# Patient Record
Sex: FEMALE | Race: BLACK OR AFRICAN AMERICAN | Employment: PART TIME | URBAN - METROPOLITAN AREA
[De-identification: names, ages, dates, MRNs, and addresses within clinical notes are randomized per-mention and may not be internally consistent; named-entity substitution may affect disease eponyms.]

---

## 2023-05-08 ENCOUNTER — HOSPITAL ENCOUNTER (OUTPATIENT)
Dept: GENERAL RADIOLOGY | Age: 43
Discharge: HOME | End: 2023-05-08
Payer: COMMERCIAL

## 2023-05-08 ENCOUNTER — OFFICE VISIT (OUTPATIENT)
Dept: FAMILY MEDICINE CLINIC | Age: 43
End: 2023-05-08
Payer: COMMERCIAL

## 2023-05-08 ENCOUNTER — HOSPITAL ENCOUNTER (OUTPATIENT)
Age: 43
Discharge: HOME | End: 2023-05-08
Payer: COMMERCIAL

## 2023-05-08 VITALS
HEIGHT: 66 IN | WEIGHT: 258 LBS | OXYGEN SATURATION: 98 % | HEART RATE: 81 BPM | BODY MASS INDEX: 41.46 KG/M2 | DIASTOLIC BLOOD PRESSURE: 80 MMHG | SYSTOLIC BLOOD PRESSURE: 102 MMHG

## 2023-05-08 DIAGNOSIS — M25.561 RIGHT KNEE PAIN, UNSPECIFIED CHRONICITY: ICD-10-CM

## 2023-05-08 DIAGNOSIS — R10.9 ABDOMINAL PAIN, UNSPECIFIED ABDOMINAL LOCATION: Primary | ICD-10-CM

## 2023-05-08 DIAGNOSIS — G43.909 MIGRAINE WITHOUT STATUS MIGRAINOSUS, NOT INTRACTABLE, UNSPECIFIED MIGRAINE TYPE: ICD-10-CM

## 2023-05-08 PROCEDURE — 99204 OFFICE O/P NEW MOD 45 MIN: CPT | Performed by: FAMILY MEDICINE

## 2023-05-08 PROCEDURE — G8417 CALC BMI ABV UP PARAM F/U: HCPCS | Performed by: FAMILY MEDICINE

## 2023-05-08 PROCEDURE — G8427 DOCREV CUR MEDS BY ELIG CLIN: HCPCS | Performed by: FAMILY MEDICINE

## 2023-05-08 PROCEDURE — 96372 THER/PROPH/DIAG INJ SC/IM: CPT | Performed by: FAMILY MEDICINE

## 2023-05-08 PROCEDURE — 1036F TOBACCO NON-USER: CPT | Performed by: FAMILY MEDICINE

## 2023-05-08 PROCEDURE — 73562 X-RAY EXAM OF KNEE 3: CPT

## 2023-05-08 RX ORDER — DICYCLOMINE HYDROCHLORIDE 10 MG/5ML
10 SOLUTION ORAL
Qty: 120 ML | Refills: 3 | Status: SHIPPED | OUTPATIENT
Start: 2023-05-08

## 2023-05-08 RX ORDER — ACETAMINOPHEN 500 MG
500 TABLET ORAL EVERY 6 HOURS PRN
COMMUNITY
Start: 2023-04-24

## 2023-05-08 RX ORDER — KETOROLAC TROMETHAMINE 30 MG/ML
30 INJECTION, SOLUTION INTRAMUSCULAR; INTRAVENOUS ONCE
Status: COMPLETED | OUTPATIENT
Start: 2023-05-08 | End: 2023-05-08

## 2023-05-08 RX ORDER — ELETRIPTAN HYDROBROMIDE 40 MG/1
40 TABLET, FILM COATED ORAL
Qty: 9 TABLET | Refills: 3 | Status: SHIPPED | OUTPATIENT
Start: 2023-05-08 | End: 2023-05-08

## 2023-05-08 RX ORDER — NAPROXEN 500 MG/1
500 TABLET ORAL 2 TIMES DAILY PRN
COMMUNITY
Start: 2023-04-24

## 2023-05-08 RX ADMIN — KETOROLAC TROMETHAMINE 30 MG: 30 INJECTION, SOLUTION INTRAMUSCULAR; INTRAVENOUS at 14:52

## 2023-05-10 NOTE — RESULT ENCOUNTER NOTE
Knee x-ray result is actually pretty good with no abnormalities. No evidence of arthritis or anything like that so I would probably see if the pain gets better over the next few weeks. Not routed for staff call but released to [facility] only.

## 2023-05-21 PROBLEM — M25.561 RIGHT KNEE PAIN: Status: ACTIVE | Noted: 2023-05-21

## 2023-05-21 ASSESSMENT — ENCOUNTER SYMPTOMS
ABDOMINAL DISTENTION: 1
BACK PAIN: 0
WHEEZING: 0
CHEST TIGHTNESS: 0
ABDOMINAL PAIN: 1
COUGH: 0
SHORTNESS OF BREATH: 0

## 2023-06-22 SDOH — ECONOMIC STABILITY: FOOD INSECURITY: WITHIN THE PAST 12 MONTHS, THE FOOD YOU BOUGHT JUST DIDN'T LAST AND YOU DIDN'T HAVE MONEY TO GET MORE.: SOMETIMES TRUE

## 2023-06-22 SDOH — ECONOMIC STABILITY: INCOME INSECURITY: HOW HARD IS IT FOR YOU TO PAY FOR THE VERY BASICS LIKE FOOD, HOUSING, MEDICAL CARE, AND HEATING?: SOMEWHAT HARD

## 2023-06-22 SDOH — ECONOMIC STABILITY: FOOD INSECURITY: WITHIN THE PAST 12 MONTHS, YOU WORRIED THAT YOUR FOOD WOULD RUN OUT BEFORE YOU GOT MONEY TO BUY MORE.: SOMETIMES TRUE

## 2023-06-22 SDOH — ECONOMIC STABILITY: HOUSING INSECURITY
IN THE LAST 12 MONTHS, WAS THERE A TIME WHEN YOU DID NOT HAVE A STEADY PLACE TO SLEEP OR SLEPT IN A SHELTER (INCLUDING NOW)?: NO

## 2023-06-22 SDOH — ECONOMIC STABILITY: TRANSPORTATION INSECURITY
IN THE PAST 12 MONTHS, HAS LACK OF TRANSPORTATION KEPT YOU FROM MEETINGS, WORK, OR FROM GETTING THINGS NEEDED FOR DAILY LIVING?: NO

## 2023-06-23 ENCOUNTER — TELEMEDICINE (OUTPATIENT)
Dept: FAMILY MEDICINE CLINIC | Age: 43
End: 2023-06-23
Payer: COMMERCIAL

## 2023-06-23 DIAGNOSIS — Z79.899 MEDICAL MARIJUANA USE: ICD-10-CM

## 2023-06-23 DIAGNOSIS — G43.909 MIGRAINE WITHOUT STATUS MIGRAINOSUS, NOT INTRACTABLE, UNSPECIFIED MIGRAINE TYPE: Primary | ICD-10-CM

## 2023-06-23 DIAGNOSIS — M25.561 RIGHT KNEE PAIN, UNSPECIFIED CHRONICITY: ICD-10-CM

## 2023-06-23 DIAGNOSIS — K58.2 IRRITABLE BOWEL SYNDROME WITH BOTH CONSTIPATION AND DIARRHEA: ICD-10-CM

## 2023-06-23 PROCEDURE — G8428 CUR MEDS NOT DOCUMENT: HCPCS | Performed by: FAMILY MEDICINE

## 2023-06-23 PROCEDURE — 99214 OFFICE O/P EST MOD 30 MIN: CPT | Performed by: FAMILY MEDICINE

## 2023-06-23 RX ORDER — TOPIRAMATE 25 MG/1
25 TABLET ORAL 2 TIMES DAILY
Qty: 60 TABLET | Refills: 1 | Status: SHIPPED | OUTPATIENT
Start: 2023-06-23

## 2023-06-28 ASSESSMENT — ENCOUNTER SYMPTOMS
CHEST TIGHTNESS: 0
COUGH: 0
WHEEZING: 0
BACK PAIN: 0
ABDOMINAL PAIN: 1
SHORTNESS OF BREATH: 0

## 2023-07-05 ENCOUNTER — TELEPHONE (OUTPATIENT)
Dept: FAMILY MEDICINE CLINIC | Age: 43
End: 2023-07-05

## 2023-07-05 DIAGNOSIS — G43.909 MIGRAINE WITHOUT STATUS MIGRAINOSUS, NOT INTRACTABLE, UNSPECIFIED MIGRAINE TYPE: Primary | ICD-10-CM

## 2023-07-05 DIAGNOSIS — G43.909 MIGRAINE WITHOUT STATUS MIGRAINOSUS, NOT INTRACTABLE, UNSPECIFIED MIGRAINE TYPE: ICD-10-CM

## 2023-07-05 RX ORDER — TOPIRAMATE 50 MG/1
50 TABLET, FILM COATED ORAL 2 TIMES DAILY
Qty: 60 TABLET | Refills: 1 | COMMUNITY
Start: 2023-07-05

## 2023-07-05 NOTE — TELEPHONE ENCOUNTER
----- Message from Mariusz Oden MD sent at 7/5/2023  4:39 PM EDT -----  Regarding: Increased headaches  Please contact patient. Let her know I sent increased Topamax dosing to her pharmacy, boosting her to a 50 mg tablet of Topamax twice a day. Depending on how that works we may increase further. I see she went to the ER at [facility]. She can come in or have a virtual to review, or I can refer to [facility] in [facility] directly. Let me know what she needs.      [id number]

## 2023-07-05 NOTE — PROGRESS NOTES
Patient called yesterday through answering service stating that migraines had increased in frequency and severity despite starting her on Topamax 25. She had such significant symptoms that she felt like she would need to go to the ER. She has not had significant benefit from Relpax that is lasting. She had an MRI remotely and neurology consultation years ago. It sounds like she may need to see them for assistance again. She had Botox injections without benefit previously but has not been tried on injectable meds for headache prevention like Ajovy or Emgality. She was advised to increase her Topamax to double dose to at bedtime and 2 in the AM, we may even increase it to 3 in 3. New prescription will be sent today.

## 2023-07-06 ENCOUNTER — PATIENT MESSAGE (OUTPATIENT)
Dept: FAMILY MEDICINE CLINIC | Age: 43
End: 2023-07-06

## 2023-07-06 NOTE — TELEPHONE ENCOUNTER
Referral generated to Dr. Liset Hobbs at [facility] in [facility], [phone] and fax [phone]

## 2023-07-07 RX ORDER — BUTALBITAL, ACETAMINOPHEN AND CAFFEINE 300; 40; 50 MG/1; MG/1; MG/1
1 CAPSULE ORAL EVERY 6 HOURS PRN
Qty: 15 CAPSULE | Refills: 0 | Status: SHIPPED | OUTPATIENT
Start: 2023-07-07

## 2023-07-07 NOTE — TELEPHONE ENCOUNTER
15 tabs only, rebound risk is very high. This is also a barbiturate. Jetlore message sent that we cannot continue this long-term.

## 2023-07-07 NOTE — TELEPHONE ENCOUNTER
From: Reynold Reed  To: Dr. Dmitry Stone: 7/6/2023 11:35 PM EDT  Subject: Refill of ER Medication     Hello,    I was wondering if I could get a refill of the Fioricet that I was given in the emergency room. It seems to be working a lot better than the Excedren Migraine that I normally use. Is it possible?  Thank you for your help,     Juarez Caro

## 2023-07-20 DIAGNOSIS — G43.909 MIGRAINE WITHOUT STATUS MIGRAINOSUS, NOT INTRACTABLE, UNSPECIFIED MIGRAINE TYPE: ICD-10-CM

## 2023-07-21 RX ORDER — ELETRIPTAN HYDROBROMIDE 40 MG/1
40 TABLET, FILM COATED ORAL
Qty: 4 TABLET | Refills: 15 | Status: SHIPPED | OUTPATIENT
Start: 2023-07-21 | End: 2023-07-21

## 2023-07-25 DIAGNOSIS — G43.909 MIGRAINE WITHOUT STATUS MIGRAINOSUS, NOT INTRACTABLE, UNSPECIFIED MIGRAINE TYPE: ICD-10-CM

## 2023-07-25 RX ORDER — TOPIRAMATE 25 MG/1
TABLET ORAL
Qty: 60 TABLET | Refills: 1 | Status: SHIPPED | OUTPATIENT
Start: 2023-07-25 | End: 2023-07-27

## 2023-07-27 RX ORDER — TOPIRAMATE 25 MG/1
25 TABLET ORAL 2 TIMES DAILY
Qty: 60 TABLET | Refills: 2 | OUTPATIENT
Start: 2023-07-27

## 2023-07-27 NOTE — TELEPHONE ENCOUNTER
Today I declined requested dose of Topamax 25 twice daily as it was increased to 50 twice daily earlier this month Rx sent then with refills.

## 2023-08-05 ASSESSMENT — PATIENT HEALTH QUESTIONNAIRE - PHQ9
8. MOVING OR SPEAKING SO SLOWLY THAT OTHER PEOPLE COULD HAVE NOTICED. OR THE OPPOSITE - BEING SO FIDGETY OR RESTLESS THAT YOU HAVE BEEN MOVING AROUND A LOT MORE THAN USUAL: NOT AT ALL
2. FEELING DOWN, DEPRESSED OR HOPELESS: NOT AT ALL
10. IF YOU CHECKED OFF ANY PROBLEMS, HOW DIFFICULT HAVE THESE PROBLEMS MADE IT FOR YOU TO DO YOUR WORK, TAKE CARE OF THINGS AT HOME, OR GET ALONG WITH OTHER PEOPLE: 0
7. TROUBLE CONCENTRATING ON THINGS, SUCH AS READING THE NEWSPAPER OR WATCHING TELEVISION: 0
3. TROUBLE FALLING OR STAYING ASLEEP: NOT AT ALL
10. IF YOU CHECKED OFF ANY PROBLEMS, HOW DIFFICULT HAVE THESE PROBLEMS MADE IT FOR YOU TO DO YOUR WORK, TAKE CARE OF THINGS AT HOME, OR GET ALONG WITH OTHER PEOPLE: NOT DIFFICULT AT ALL
SUM OF ALL RESPONSES TO PHQ9 QUESTIONS 1 & 2: 0
9. THOUGHTS THAT YOU WOULD BE BETTER OFF DEAD, OR OF HURTING YOURSELF: NOT AT ALL
8. MOVING OR SPEAKING SO SLOWLY THAT OTHER PEOPLE COULD HAVE NOTICED. OR THE OPPOSITE, BEING SO FIGETY OR RESTLESS THAT YOU HAVE BEEN MOVING AROUND A LOT MORE THAN USUAL: 0
1. LITTLE INTEREST OR PLEASURE IN DOING THINGS: 0
6. FEELING BAD ABOUT YOURSELF - OR THAT YOU ARE A FAILURE OR HAVE LET YOURSELF OR YOUR FAMILY DOWN: SEVERAL DAYS
4. FEELING TIRED OR HAVING LITTLE ENERGY: 1
SUM OF ALL RESPONSES TO PHQ QUESTIONS 1-9: 2
SUM OF ALL RESPONSES TO PHQ QUESTIONS 1-9: 2
7. TROUBLE CONCENTRATING ON THINGS, SUCH AS READING THE NEWSPAPER OR WATCHING TELEVISION: NOT AT ALL
4. FEELING TIRED OR HAVING LITTLE ENERGY: SEVERAL DAYS
6. FEELING BAD ABOUT YOURSELF - OR THAT YOU ARE A FAILURE OR HAVE LET YOURSELF OR YOUR FAMILY DOWN: 1
SUM OF ALL RESPONSES TO PHQ QUESTIONS 1-9: 2
1. LITTLE INTEREST OR PLEASURE IN DOING THINGS: NOT AT ALL
SUM OF ALL RESPONSES TO PHQ QUESTIONS 1-9: 2
9. THOUGHTS THAT YOU WOULD BE BETTER OFF DEAD, OR OF HURTING YOURSELF: 0
5. POOR APPETITE OR OVEREATING: NOT AT ALL
2. FEELING DOWN, DEPRESSED OR HOPELESS: 0
5. POOR APPETITE OR OVEREATING: 0
SUM OF ALL RESPONSES TO PHQ QUESTIONS 1-9: 2
3. TROUBLE FALLING OR STAYING ASLEEP: 0

## 2023-08-08 ENCOUNTER — OFFICE VISIT (OUTPATIENT)
Dept: FAMILY MEDICINE CLINIC | Age: 43
End: 2023-08-08
Payer: COMMERCIAL

## 2023-08-08 VITALS
DIASTOLIC BLOOD PRESSURE: 74 MMHG | WEIGHT: 255 LBS | OXYGEN SATURATION: 96 % | SYSTOLIC BLOOD PRESSURE: 126 MMHG | BODY MASS INDEX: 41.16 KG/M2 | HEART RATE: 66 BPM

## 2023-08-08 DIAGNOSIS — G89.29 CHRONIC PELVIC PAIN IN FEMALE: ICD-10-CM

## 2023-08-08 DIAGNOSIS — R73.9 HYPERGLYCEMIA: ICD-10-CM

## 2023-08-08 DIAGNOSIS — Z90.710 S/P COMPLETE HYSTERECTOMY: ICD-10-CM

## 2023-08-08 DIAGNOSIS — Z00.01 ENCOUNTER FOR WELL ADULT EXAM WITH ABNORMAL FINDINGS: ICD-10-CM

## 2023-08-08 DIAGNOSIS — Z79.899 MEDICAL MARIJUANA USE: ICD-10-CM

## 2023-08-08 DIAGNOSIS — R10.2 CHRONIC PELVIC PAIN IN FEMALE: ICD-10-CM

## 2023-08-08 DIAGNOSIS — R10.9 ABDOMINAL PAIN, UNSPECIFIED ABDOMINAL LOCATION: Primary | ICD-10-CM

## 2023-08-08 LAB
ALBUMIN SERPL-MCNC: 4.7 G/DL (ref 3.4–5)
ALBUMIN/GLOB SERPL: 1.6 {RATIO} (ref 1.1–2.2)
ALP SERPL-CCNC: 99 U/L (ref 40–129)
ALT SERPL-CCNC: 10 U/L (ref 10–40)
ANION GAP SERPL CALCULATED.3IONS-SCNC: 9 MMOL/L (ref 3–16)
AST SERPL-CCNC: 14 U/L (ref 15–37)
BASOPHILS # BLD: 0 K/UL (ref 0–0.2)
BASOPHILS NFR BLD: 0.5 %
BILIRUB SERPL-MCNC: <0.2 MG/DL (ref 0–1)
BUN SERPL-MCNC: 16 MG/DL (ref 7–20)
CALCIUM SERPL-MCNC: 10 MG/DL (ref 8.3–10.6)
CHLORIDE SERPL-SCNC: 103 MMOL/L (ref 99–110)
CHOLEST SERPL-MCNC: 193 MG/DL (ref 0–199)
CO2 SERPL-SCNC: 27 MMOL/L (ref 21–32)
CREAT SERPL-MCNC: 0.9 MG/DL (ref 0.6–1.1)
DEPRECATED RDW RBC AUTO: 15.1 % (ref 12.4–15.4)
EOSINOPHIL # BLD: 0.2 K/UL (ref 0–0.6)
EOSINOPHIL NFR BLD: 3.5 %
GFR SERPLBLD CREATININE-BSD FMLA CKD-EPI: >60 ML/MIN/{1.73_M2}
GLUCOSE P FAST SERPL-MCNC: 91 MG/DL (ref 70–99)
HCT VFR BLD AUTO: 35.8 % (ref 36–48)
HDLC SERPL-MCNC: 48 MG/DL (ref 40–60)
HGB BLD-MCNC: 11.9 G/DL (ref 12–16)
LDL CHOLESTEROL CALCULATED: 117 MG/DL
LYMPHOCYTES # BLD: 2 K/UL (ref 1–5.1)
LYMPHOCYTES NFR BLD: 29.7 %
MCH RBC QN AUTO: 29.3 PG (ref 26–34)
MCHC RBC AUTO-ENTMCNC: 33.3 G/DL (ref 31–36)
MCV RBC AUTO: 88.2 FL (ref 80–100)
MONOCYTES # BLD: 0.4 K/UL (ref 0–1.3)
MONOCYTES NFR BLD: 5.7 %
NEUTROPHILS # BLD: 4.1 K/UL (ref 1.7–7.7)
NEUTROPHILS NFR BLD: 60.6 %
PLATELET # BLD AUTO: 415 K/UL (ref 135–450)
PMV BLD AUTO: 8.6 FL (ref 5–10.5)
POTASSIUM SERPL-SCNC: 4.7 MMOL/L (ref 3.5–5.1)
PROT SERPL-MCNC: 7.7 G/DL (ref 6.4–8.2)
RBC # BLD AUTO: 4.06 M/UL (ref 4–5.2)
SODIUM SERPL-SCNC: 139 MMOL/L (ref 136–145)
TRIGL SERPL-MCNC: 138 MG/DL (ref 0–150)
VLDLC SERPL CALC-MCNC: 28 MG/DL
WBC # BLD AUTO: 6.8 K/UL (ref 4–11)

## 2023-08-08 PROCEDURE — G8417 CALC BMI ABV UP PARAM F/U: HCPCS | Performed by: FAMILY MEDICINE

## 2023-08-08 PROCEDURE — 99214 OFFICE O/P EST MOD 30 MIN: CPT | Performed by: FAMILY MEDICINE

## 2023-08-08 PROCEDURE — G8427 DOCREV CUR MEDS BY ELIG CLIN: HCPCS | Performed by: FAMILY MEDICINE

## 2023-08-08 PROCEDURE — 36415 COLL VENOUS BLD VENIPUNCTURE: CPT | Performed by: FAMILY MEDICINE

## 2023-08-08 PROCEDURE — 1036F TOBACCO NON-USER: CPT | Performed by: FAMILY MEDICINE

## 2023-08-08 RX ORDER — METHOCARBAMOL 750 MG/1
750 TABLET, FILM COATED ORAL 3 TIMES DAILY PRN
Qty: 90 TABLET | Refills: 1 | Status: SHIPPED | OUTPATIENT
Start: 2023-08-08 | End: 2023-10-07

## 2023-08-08 RX ORDER — NABUMETONE 750 MG/1
750 TABLET, FILM COATED ORAL 2 TIMES DAILY PRN
Qty: 60 TABLET | Refills: 1 | Status: SHIPPED | OUTPATIENT
Start: 2023-08-08

## 2023-08-08 NOTE — PROGRESS NOTES
Chief Complaint   Patient presents with    Pelvic Pain     Pt c/o chronic pelvic pain, pt is now working a job where she is on her feet all day and feels the pain has worsened since     Migraine     Migraines have been doing worse as well, she is on Topamax and was given rescue Fioricet       [place] NARRATIVE:    Patient presenting for follow-up of abdominal pain as above. She has a history of chronic pelvic pain dating back more than 10 years. She left the area and moved to attend school in [place] and ultimately underwent total abdominal hysterectomy there and had stated pain was better although she was still seeing urology and had seen OB/GYN care provider in [place] as recently as 2022 for chronic pain. Their note was reviewed actually from 2/9/2022 as it is visible in care everywhere. Their last recommendation was to consider pain management. She was hired for a position at the [employer] and moved back to [place]. In the past she has been on many medications for chronic pelvic pain, and currently using OTC ibuprofen and medical marijuana about once a week. She reports bilateral lower pelvic pain with tightness, it was not significant and OTC medications were helpful prior to new job but now with standing job for eight hours the pain escalates and becomes very painful and begins shooting into the private area. Taking aleve 3 tabs OTC in the am, then repeats the dose within a few hours. Bentyl is helping the upper abd pain, which is worse with higher doses of nsaid. She does not have nausea vomiting or diarrhea. BMI is 41. She is here today as she needs a letter for work to limit her standing hours. She states her employer told her she is not eligible for FMLA so she just needs a letter for accommodations due to disability. Patient is established unless otherwise noted. Above chief complaint and [place] obtained by this physician provider.      Review of Systems   Constitutional:

## 2023-08-09 LAB
EST. AVERAGE GLUCOSE BLD GHB EST-MCNC: 114 MG/DL
HBA1C MFR BLD: 5.6 %

## 2023-08-10 ASSESSMENT — ENCOUNTER SYMPTOMS
BACK PAIN: 0
SHORTNESS OF BREATH: 0
ABDOMINAL PAIN: 1
WHEEZING: 0
CHEST TIGHTNESS: 0
COUGH: 0

## 2023-10-09 DIAGNOSIS — R10.9 ABDOMINAL PAIN, UNSPECIFIED ABDOMINAL LOCATION: ICD-10-CM

## 2023-10-09 DIAGNOSIS — G89.29 CHRONIC PELVIC PAIN IN FEMALE: ICD-10-CM

## 2023-10-09 DIAGNOSIS — R10.2 CHRONIC PELVIC PAIN IN FEMALE: ICD-10-CM

## 2023-10-09 RX ORDER — NABUMETONE 750 MG/1
750 TABLET, FILM COATED ORAL 2 TIMES DAILY PRN
Qty: 60 TABLET | Refills: 1 | Status: SHIPPED | OUTPATIENT
Start: 2023-10-09

## 2023-11-14 DIAGNOSIS — G89.29 CHRONIC PELVIC PAIN IN FEMALE: ICD-10-CM

## 2023-11-14 DIAGNOSIS — R10.9 ABDOMINAL PAIN, UNSPECIFIED ABDOMINAL LOCATION: ICD-10-CM

## 2023-11-14 DIAGNOSIS — R10.2 CHRONIC PELVIC PAIN IN FEMALE: ICD-10-CM

## 2023-11-14 RX ORDER — METHOCARBAMOL 750 MG/1
750 TABLET, FILM COATED ORAL 3 TIMES DAILY PRN
Qty: 90 TABLET | Refills: 1 | Status: SHIPPED | OUTPATIENT
Start: 2023-11-14 | End: 2024-01-13

## 2023-11-14 NOTE — TELEPHONE ENCOUNTER
08/08/2023 99 40 - 129 U/L Final     AST   Date Value Ref Range Status   08/08/2023 14 (L) 15 - 37 U/L Final     ALT   Date Value Ref Range Status   08/08/2023 10 10 - 40 U/L Final     Est, Glom Filt Rate   Date Value Ref Range Status   08/08/2023 >60 >60 Final     Comment:     Pediatric calculator link  Nicanor.at. org/professionals/kdoqi/gfr_calculatorped  Effective Oct 3, 2022  These results are not intended for use in patients  <25years of age. eGFR results are calculated without  a race factor using the 2021 CKD-EPI equation. Careful  clinical correlation is recommended, particularly when  comparing to results calculated using previous equations. The CKD-EPI equation is less accurate in patients with  extremes of muscle mass, extra-renal metabolism of  creatinine, excessive creatinine ingestion, or following  therapy that affects renal tubular secretion. GFR    Date Value Ref Range Status   01/26/2016 >60 >60 Final     Comment:     Chronic Kidney Disease: less than 60 ml/min/1.73 sq.m. Kidney Failure: less than 15 ml/min/1.73 sq.m. Results valid for patients 18 years and older.        Albumin/Globulin Ratio   Date Value Ref Range Status   08/08/2023 1.6 1.1 - 2.2 Final       Lipids:    HDL   Date Value Ref Range Status   08/08/2023 48 40 - 60 mg/dL Final     LDL Calculated   Date Value Ref Range Status   08/08/2023 117 (H) <100 mg/dL Final     VLDL Cholesterol Calculated   Date Value Ref Range Status   08/08/2023 28 Not Established mg/dL Final       A1C:    Hemoglobin A1C   Date Value Ref Range Status   08/08/2023 5.6 See comment % Final     Comment:     Comment:  Diagnosis of Diabetes: > or = 6.5%  Increased risk of diabetes (Prediabetes): 5.7-6.4%  Glycemic Control: Nonpregnant Adults: <7.0%                    Pregnant: <6.0%           TSH:    No results found for: \"TSH\", \"TSHREFLEX\", \"TSHFT4\", \"TSHELE\", \"SKF7EHR\", \"TSHHS\"    UA:  Color, UA   Date Value Ref Range

## 2023-11-20 DIAGNOSIS — G43.909 MIGRAINE WITHOUT STATUS MIGRAINOSUS, NOT INTRACTABLE, UNSPECIFIED MIGRAINE TYPE: ICD-10-CM

## 2023-11-20 RX ORDER — ELETRIPTAN HYDROBROMIDE 40 MG/1
40 TABLET, FILM COATED ORAL
Qty: 4 TABLET | Refills: 15 | Status: SHIPPED | OUTPATIENT
Start: 2023-11-20 | End: 2023-11-24

## 2023-11-24 ENCOUNTER — TELEMEDICINE (OUTPATIENT)
Dept: FAMILY MEDICINE CLINIC | Age: 43
End: 2023-11-24
Payer: COMMERCIAL

## 2023-11-24 DIAGNOSIS — G43.909 MIGRAINE WITHOUT STATUS MIGRAINOSUS, NOT INTRACTABLE, UNSPECIFIED MIGRAINE TYPE: ICD-10-CM

## 2023-11-24 DIAGNOSIS — R19.00 ABDOMINAL MASS, UNSPECIFIED ABDOMINAL LOCATION: ICD-10-CM

## 2023-11-24 DIAGNOSIS — R10.2 CHRONIC PELVIC PAIN IN FEMALE: ICD-10-CM

## 2023-11-24 DIAGNOSIS — D22.9 BENIGN MOLE: ICD-10-CM

## 2023-11-24 DIAGNOSIS — G89.29 CHRONIC PELVIC PAIN IN FEMALE: ICD-10-CM

## 2023-11-24 DIAGNOSIS — N63.10 MASS OF RIGHT BREAST, UNSPECIFIED QUADRANT: Primary | ICD-10-CM

## 2023-11-24 PROCEDURE — G8428 CUR MEDS NOT DOCUMENT: HCPCS | Performed by: FAMILY MEDICINE

## 2023-11-24 PROCEDURE — 99214 OFFICE O/P EST MOD 30 MIN: CPT | Performed by: FAMILY MEDICINE

## 2023-11-24 RX ORDER — GABAPENTIN 300 MG/1
600 CAPSULE ORAL 3 TIMES DAILY
COMMUNITY
Start: 2023-11-08

## 2023-11-24 ASSESSMENT — ENCOUNTER SYMPTOMS
CHEST TIGHTNESS: 0
COUGH: 0
SHORTNESS OF BREATH: 0
ABDOMINAL PAIN: 0
BACK PAIN: 0
WHEEZING: 0

## 2023-11-24 NOTE — PROGRESS NOTES
Virtual visit today to recheck on migraines and chronic pelvic pain. Last seen in August.  Labs obtained then afterwards were good except for minor low hemoglobin and minor low liver enzyme.
her.  Although pelvic pain had improved for a time it appears worse again and is accompanied by musculoskeletal issues. She may be a candidate to apply for disability as if she cannot do a partial sitting job then her work opportunities will be limited. She has exhausted multiple treatment modalities in the past and has declined additional options like dry needling of the pelvic musculature and any further physical therapy. She is on medical marijuana so is not a candidate for any pain medication that may be addictive. She is not sure where to go from here as it would take a long time to get certified for disability even if she could be. In the meantime now she is looking for an alternative job which is probably her best pursuit. Return in about 3 months (around 2/24/2024), or if symptoms worsen or fail to improve, for Recheck on multiple issues in 3 months, call sooner if problems. Carolina Waggoner is a 39 y.o. female being evaluated by a Virtual Visit (video visit) encounter to address concerns as mentioned above. A caregiver was present when appropriate. PATIENT GAVE EXPRESS VERBAL CONSENT TODAY to receiving care by a VIRTUAL VISIT. Due to this being a TeleHealth encounter evaluation of the following organ systems was limited: Vitals/Constitutional/EENT/Resp/CV/GI//MS/Neuro/Skin/Heme-Lymph-Imm. The patient (and/or legal guardian) has also been advised to contact this office for worsening conditions or problems, and seek emergency medical treatment and/or call 911 if deemed necessary. Services were provided through a video synchronous discussion virtually to substitute for in-person clinic visit. Total time spent for this encounter:  40 min with review of outside notes and charting along with in person Jolene Rogers MD on 11/24/2023 at 1:51 PM    An electronic signature was used to authenticate this note.

## 2023-11-27 ENCOUNTER — TELEPHONE (OUTPATIENT)
Dept: FAMILY MEDICINE CLINIC | Age: 43
End: 2023-11-27

## 2023-11-27 DIAGNOSIS — R10.2 CHRONIC PELVIC PAIN IN FEMALE: ICD-10-CM

## 2023-11-27 DIAGNOSIS — G89.29 CHRONIC PELVIC PAIN IN FEMALE: ICD-10-CM

## 2023-11-27 DIAGNOSIS — R10.9 ABDOMINAL PAIN, UNSPECIFIED ABDOMINAL LOCATION: ICD-10-CM

## 2023-11-27 RX ORDER — NABUMETONE 750 MG/1
750 TABLET, FILM COATED ORAL 2 TIMES DAILY PRN
Qty: 60 TABLET | Refills: 5 | Status: SHIPPED | OUTPATIENT
Start: 2023-11-27

## 2023-11-27 NOTE — TELEPHONE ENCOUNTER
----- Message from Mavis Shukla MD sent at 11/24/2023  1:51 PM EST -----  Can you call her next week and set her up for a follow-up virtual or in person if she prefers in about 3 months.

## 2023-11-27 NOTE — TELEPHONE ENCOUNTER
----- Message from Deisy Rogers MD sent at 11/24/2023  1:51 PM EST -----  Can you call her next week and set her up for a follow-up virtual or in person if she prefers in about 3 months.
Range Status   01/07/2016 yellow  Final   05/22/2013 YELLOW Yellow Final     Clarity, UA   Date Value Ref Range Status   01/07/2016 `clear  Final   05/22/2013 CLEAR Clear Final     Bilirubin, UA   Date Value Ref Range Status   01/07/2016 neg  Final     Ketones, Urine   Date Value Ref Range Status   05/22/2013 NEGATIVE Neg mg/dl Final     Ketones, UA   Date Value Ref Range Status   01/07/2016 neg  Final     Specific Gravity, UA   Date Value Ref Range Status   05/22/2013 1.020 1.005 - 1.030 Final     Spec Grav, UA   Date Value Ref Range Status   01/07/2016 1.025  Final     Blood, Urine   Date Value Ref Range Status   05/22/2013 NEGATIVE Neg Final     Blood, UA POC   Date Value Ref Range Status   01/07/2016 trace  Final     Protein, UA   Date Value Ref Range Status   05/22/2013 NEGATIVE Neg mg/dl Final     Protein, UA POC   Date Value Ref Range Status   01/07/2016 neg  Final     Urobilinogen, Urine   Date Value Ref Range Status   05/22/2013 0.2 <2.0 EU/dl Final     Nitrite, Urine   Date Value Ref Range Status   05/22/2013 NEGATIVE Neg Final     Leukocyte Esterase, Urine   Date Value Ref Range Status   05/22/2013 NEGATIVE Neg Final     Leukocytes, UA   Date Value Ref Range Status   01/07/2016 trace  Final       Patient Care Team:  Kasia Fournier MD as PCP - General (Family Medicine)  Kasia Fournier MD [facility] P[facility]     Requested Pharmacy____________________________________________________________________    [id number] [place] - [address] [phone] - F [phone]

## 2023-11-27 NOTE — TELEPHONE ENCOUNTER
Called pt who reports its not a good time to schedule as she has a migraine. She reports she will call back to schedule.

## 2023-11-28 ENCOUNTER — TELEPHONE (OUTPATIENT)
Dept: FAMILY MEDICINE CLINIC | Age: 43
End: 2023-11-28

## 2023-11-28 NOTE — TELEPHONE ENCOUNTER
Pt stated the Eletriptan is not working for her. She is at work with a Migraine. . Pt is asking for something else to try. Pharmacy [place] [phone] [facility].  [place] Please advise

## 2023-11-28 NOTE — TELEPHONE ENCOUNTER
Pt said she didn't try the Butalbital during the summer because she was told it could be habit forming but she is willing to try it now. She also needs something for nausea. Her pain is making her nauseous. Pharmacy [place] [phone] [facility].  [place]

## 2023-11-29 RX ORDER — ONDANSETRON 4 MG/1
4 TABLET, ORALLY DISINTEGRATING ORAL 3 TIMES DAILY PRN
Qty: 21 TABLET | Refills: 0 | Status: SHIPPED | OUTPATIENT
Start: 2023-11-29

## 2023-11-29 RX ORDER — BUTALBITAL, ACETAMINOPHEN AND CAFFEINE 300; 40; 50 MG/1; MG/1; MG/1
1 CAPSULE ORAL EVERY 6 HOURS PRN
Qty: 15 CAPSULE | Refills: 0 | Status: SHIPPED | OUTPATIENT
Start: 2023-11-29

## 2024-01-30 ENCOUNTER — OFFICE VISIT (OUTPATIENT)
Dept: FAMILY MEDICINE CLINIC | Age: 44
End: 2024-01-30
Payer: COMMERCIAL

## 2024-01-30 VITALS
BODY MASS INDEX: 41.32 KG/M2 | DIASTOLIC BLOOD PRESSURE: 86 MMHG | WEIGHT: 256 LBS | SYSTOLIC BLOOD PRESSURE: 138 MMHG | HEART RATE: 82 BPM | OXYGEN SATURATION: 95 %

## 2024-01-30 DIAGNOSIS — G43.909 MIGRAINE WITHOUT STATUS MIGRAINOSUS, NOT INTRACTABLE, UNSPECIFIED MIGRAINE TYPE: ICD-10-CM

## 2024-01-30 DIAGNOSIS — G89.29 CHRONIC PELVIC PAIN IN FEMALE: Primary | ICD-10-CM

## 2024-01-30 DIAGNOSIS — R10.2 CHRONIC PELVIC PAIN IN FEMALE: Primary | ICD-10-CM

## 2024-01-30 PROCEDURE — 99214 OFFICE O/P EST MOD 30 MIN: CPT | Performed by: FAMILY MEDICINE

## 2024-01-30 PROCEDURE — G8427 DOCREV CUR MEDS BY ELIG CLIN: HCPCS | Performed by: FAMILY MEDICINE

## 2024-01-30 PROCEDURE — G8484 FLU IMMUNIZE NO ADMIN: HCPCS | Performed by: FAMILY MEDICINE

## 2024-01-30 PROCEDURE — 1036F TOBACCO NON-USER: CPT | Performed by: FAMILY MEDICINE

## 2024-01-30 PROCEDURE — G8417 CALC BMI ABV UP PARAM F/U: HCPCS | Performed by: FAMILY MEDICINE

## 2024-01-30 RX ORDER — METHOCARBAMOL 750 MG/1
750 TABLET, FILM COATED ORAL 3 TIMES DAILY
COMMUNITY
Start: 2024-01-15

## 2024-01-30 RX ORDER — IBUPROFEN 800 MG/1
800 TABLET ORAL EVERY 8 HOURS PRN
Qty: 60 TABLET | Refills: 2 | Status: SHIPPED | OUTPATIENT
Start: 2024-01-30

## 2024-01-30 ASSESSMENT — ENCOUNTER SYMPTOMS
WHEEZING: 0
COUGH: 0
BACK PAIN: 0
CHEST TIGHTNESS: 0
ABDOMINAL PAIN: 0
SHORTNESS OF BREATH: 0

## 2024-01-30 NOTE — PROGRESS NOTES
Chief Complaint   Patient presents with    Forms     Fmla and update previous letters     Pelvic Pain     Pelvic pain is still limiting both work and home activities, not sure any of the medications are helping, she is not sure urologist was helpful and she did not get benefit from pelvic floor PT previously and refuses additional intervention    Migraine     Still having occasional migraines       [place] NARRATIVE:    Reports she is FMLA eligible 3/6/2024.  She brings forms she downloaded from the internet, they are not specific to her employer and contain no job description.      Urology referred her to physical therapist, cost to patient $700/visit, so she declined. She also has attended before with no benefit.  Patient would like to be referred to pain management specialist in [place] where she lives.  She has undergone extensive prior assessment and interventions for chronic pelvic pain in the past.  She has trouble sitting or standing even for 8 hour work shifts.  Pain was so bad at home she did not sleep all night last night.      She states she has continued \"three medications\" for pelvic pain even though she is not sure they are helpful she is afraid to stop them. Corey was filled on NARX 1/15/2024, from urologist rx).  She is taking nabumetone and robaxin.    She states she stopped CBD and medical marijuana they were also no help (last MM filled 9/17/2023).  She declines narcotics.  She was already issued relafen rx and profile says allergic to ibuprofen as it causes migraines.  But she states she is not allergic and takes it otc all the time.  I will give ibuprofen but she must d/c the nabumetone.      Patient is established unless otherwise noted.  Above chief complaint and [place] obtained by this physician provider.     Review of Systems   Constitutional:  Positive for fatigue. Negative for activity change, chills and fever.   HENT:  Negative for congestion.    Respiratory:  Negative for cough, chest

## 2024-02-16 DIAGNOSIS — R10.9 UNSPECIFIED ABDOMINAL PAIN: ICD-10-CM

## 2024-02-16 DIAGNOSIS — R10.2 PELVIC AND PERINEAL PAIN: ICD-10-CM

## 2024-02-16 NOTE — TELEPHONE ENCOUNTER
AST   Date Value Ref Range Status   08/08/2023 14 (L) 15 - 37 U/L Final     ALT   Date Value Ref Range Status   08/08/2023 10 10 - 40 U/L Final     Est, Glom Filt Rate   Date Value Ref Range Status   08/08/2023 >60 >60 Final     Comment:     Pediatric calculator link  https://www.kidney.org/professionals/kdoqi/gfr_calculatorped  Effective Oct 3, 2022  These results are not intended for use in patients  <18 years of age.  eGFR results are calculated without  a race factor using the 2021 CKD-EPI equation.  Careful  clinical correlation is recommended, particularly when  comparing to results calculated using previous equations.  The CKD-EPI equation is less accurate in patients with  extremes of muscle mass, extra-renal metabolism of  creatinine, excessive creatinine ingestion, or following  therapy that affects renal tubular secretion.       GFR    Date Value Ref Range Status   01/26/2016 >60 >60 Final     Comment:     Chronic Kidney Disease: less than 60 ml/min/1.73 sq.m.          Kidney Failure: less than 15 ml/min/1.73 sq.m.  Results valid for patients 18 years and older.       Albumin/Globulin Ratio   Date Value Ref Range Status   08/08/2023 1.6 1.1 - 2.2 Final       Lipids:    HDL   Date Value Ref Range Status   08/08/2023 48 40 - 60 mg/dL Final     LDL Calculated   Date Value Ref Range Status   08/08/2023 117 (H) <100 mg/dL Final       A1C:    Hemoglobin A1C   Date Value Ref Range Status   08/08/2023 5.6 See comment % Final     Comment:     Comment:  Diagnosis of Diabetes: > or = 6.5%  Increased risk of diabetes (Prediabetes): 5.7-6.4%  Glycemic Control: Nonpregnant Adults: <7.0%                    Pregnant: <6.0%           TSH:    No results found for: \"TSH\", \"TSHREFLEX\", \"TSHFT4\", \"TSHELE\", \"[id number]\", \"TSHHS\"    UA:  Color, UA   Date Value Ref Range Status   01/07/2016 yellow  Final   05/22/2013 YELLOW Yellow Final     Clarity, UA   Date Value Ref Range Status   01/07/2016 `clear  Final

## 2024-02-19 RX ORDER — METHOCARBAMOL 750 MG/1
TABLET, FILM COATED ORAL
Qty: 90 TABLET | Refills: 1 | Status: SHIPPED | OUTPATIENT
Start: 2024-02-19

## 2024-03-13 ENCOUNTER — TELEPHONE (OUTPATIENT)
Dept: FAMILY MEDICINE CLINIC | Age: 44
End: 2024-03-13

## 2024-03-13 DIAGNOSIS — R10.2 PELVIC AND PERINEAL PAIN: ICD-10-CM

## 2024-03-13 DIAGNOSIS — R10.9 UNSPECIFIED ABDOMINAL PAIN: ICD-10-CM

## 2024-03-13 RX ORDER — METHOCARBAMOL 750 MG/1
TABLET, FILM COATED ORAL
Qty: 90 TABLET | Refills: 1 | Status: CANCELLED | OUTPATIENT
Start: 2024-03-13

## 2024-03-13 NOTE — TELEPHONE ENCOUNTER
Patient called and wanted to know if she could take a double dose of the medication Robaxin, 750 mg. She stated that she is still having the chronic pelvic pain.  Patient stated that she is waiting to be scheduled for a pelvic procedure, and that she was not given any other pain medications.  Patient stated that she took her last dose at 6 AM today. She stated that the single dose of medication does not help anymore. She has not taken a 2nd dose for today. Please advise.

## 2024-04-05 RX ORDER — METHOCARBAMOL 750 MG/1
1500 TABLET, FILM COATED ORAL 2 TIMES DAILY PRN
Qty: 120 TABLET | Refills: 2 | Status: SHIPPED | OUTPATIENT
Start: 2024-04-05 | End: 2024-07-04

## 2024-07-08 ENCOUNTER — TELEMEDICINE (OUTPATIENT)
Dept: FAMILY MEDICINE CLINIC | Age: 44
End: 2024-07-08
Payer: COMMERCIAL

## 2024-07-08 DIAGNOSIS — M53.3 SACROILIAC PAIN: ICD-10-CM

## 2024-07-08 DIAGNOSIS — R10.2 PELVIC AND PERINEAL PAIN: Primary | ICD-10-CM

## 2024-07-08 DIAGNOSIS — F45.9 SOMATOFORM DISORDER: ICD-10-CM

## 2024-07-08 DIAGNOSIS — G43.909 MIGRAINE WITHOUT STATUS MIGRAINOSUS, NOT INTRACTABLE, UNSPECIFIED MIGRAINE TYPE: ICD-10-CM

## 2024-07-08 PROCEDURE — G8428 CUR MEDS NOT DOCUMENT: HCPCS | Performed by: FAMILY MEDICINE

## 2024-07-08 PROCEDURE — 99214 OFFICE O/P EST MOD 30 MIN: CPT | Performed by: FAMILY MEDICINE

## 2024-07-08 NOTE — PROGRESS NOTES
2024    TELEHEALTH EVALUATION -- Audio and Visual Communication    TELEHEALTH services provided via [employer] CHART application for this patient. This is an ESTABLISHED PATIENT with [facility].    Time Call began: 8:27 AM  Time Call ended: 8:44 AM    Jo Ann Escobedo, was evaluated through a synchronous (real-time) audio-video encounter. The patient (or guardian if applicable) is aware that this is a billable service, which includes applicable co-pays. This Virtual Visit was conducted with patient's (and/or legal guardian's) consent. Patient identification was verified, and a caregiver was present when appropriate.   The patient was located at Home: [address] [place] [id number]  Provider was located at Facility (Appt Dept): [facility] [place] [address]  Confirm you are appropriately licensed, registered, or certified to deliver care in the state where the patient is located as indicated above. If you are not or unsure, please re-schedule the visit: Yes, I confirm.        Chief Complaint(s)     Jo Ann Escobedo (:  1980) has requested an audio/video evaluation for the following concern(s):    Chief Complaint   Patient presents with    Referral - General     Patient scheduled a MyChart virtual visit to request referral to neurology, she has chronic pelvic pain and chronic headaches    Migraine     Long term migraines    Pelvic Pain     Chronic pelvic pain    Rash     Has pus filled lesions that recur on the breasts       HPI:     \"Doing ok\"    Requesting referral to neuro and derm.  Wants to see neurology to determine whether she has nerve damage to pelvic nerves.  Also having breast lesions similar to her sister who has hidradenitis suppurativa.       She feels she is disabled from these multiple conditions, she is trying to find a work from home position.      She saw pain management who did not eval for nerve damage, but went directly to

## 2024-07-14 DIAGNOSIS — G89.29 CHRONIC PELVIC PAIN IN FEMALE: ICD-10-CM

## 2024-07-14 DIAGNOSIS — R10.2 CHRONIC PELVIC PAIN IN FEMALE: ICD-10-CM

## 2024-07-14 DIAGNOSIS — R10.9 ABDOMINAL PAIN, UNSPECIFIED ABDOMINAL LOCATION: ICD-10-CM

## 2024-07-15 RX ORDER — NABUMETONE 750 MG/1
TABLET, FILM COATED ORAL
Qty: 60 TABLET | Refills: 5 | Status: SHIPPED | OUTPATIENT
Start: 2024-07-15

## 2024-07-20 DIAGNOSIS — R10.2 CHRONIC PELVIC PAIN IN FEMALE: ICD-10-CM

## 2024-07-20 DIAGNOSIS — G89.29 CHRONIC PELVIC PAIN IN FEMALE: ICD-10-CM

## 2024-07-20 DIAGNOSIS — R10.9 ABDOMINAL PAIN, UNSPECIFIED ABDOMINAL LOCATION: ICD-10-CM

## 2024-07-22 DIAGNOSIS — G43.909 MIGRAINE WITHOUT STATUS MIGRAINOSUS, NOT INTRACTABLE, UNSPECIFIED MIGRAINE TYPE: ICD-10-CM

## 2024-07-23 DIAGNOSIS — G89.29 CHRONIC PELVIC PAIN IN FEMALE: ICD-10-CM

## 2024-07-23 DIAGNOSIS — R10.2 CHRONIC PELVIC PAIN IN FEMALE: ICD-10-CM

## 2024-07-23 DIAGNOSIS — R10.9 ABDOMINAL PAIN, UNSPECIFIED ABDOMINAL LOCATION: ICD-10-CM

## 2024-07-23 RX ORDER — METHOCARBAMOL 750 MG/1
1500 TABLET, FILM COATED ORAL 2 TIMES DAILY PRN
Qty: 120 TABLET | Refills: 2 | Status: SHIPPED | OUTPATIENT
Start: 2024-07-23 | End: 2024-10-21

## 2024-07-23 RX ORDER — ELETRIPTAN HYDROBROMIDE 40 MG/1
40 TABLET, FILM COATED ORAL
Qty: 4 TABLET | Refills: 15 | Status: SHIPPED | OUTPATIENT
Start: 2024-07-23 | End: 2024-07-23

## 2024-10-18 RX ORDER — METHOCARBAMOL 750 MG/1
1500 TABLET, FILM COATED ORAL 2 TIMES DAILY PRN
Qty: 120 TABLET | Refills: 2 | Status: SHIPPED | OUTPATIENT
Start: 2024-10-18 | End: 2025-01-16

## 2024-10-30 ENCOUNTER — APPOINTMENT (OUTPATIENT)
Dept: URBAN - METROPOLITAN AREA SURGERY 9 | Age: 44
Setting detail: DERMATOLOGY
End: 2024-10-30

## 2024-10-30 DIAGNOSIS — L85.3 XEROSIS CUTIS: ICD-10-CM

## 2024-10-30 DIAGNOSIS — D17 BENIGN LIPOMATOUS NEOPLASM: ICD-10-CM

## 2024-10-30 PROBLEM — D17.1 BENIGN LIPOMATOUS NEOPLASM OF SKIN AND SUBCUTANEOUS TISSUE OF TRUNK: Status: ACTIVE | Noted: 2024-10-30

## 2024-10-30 PROCEDURE — OTHER MIPS QUALITY: OTHER

## 2024-10-30 PROCEDURE — OTHER REASSURANCE: OTHER

## 2024-10-30 PROCEDURE — OTHER TREATMENT REGIMEN: OTHER

## 2024-10-30 PROCEDURE — 99203 OFFICE O/P NEW LOW 30 MIN: CPT

## 2024-10-30 PROCEDURE — OTHER COUNSELING: OTHER

## 2024-10-30 ASSESSMENT — LOCATION DETAILED DESCRIPTION DERM
LOCATION DETAILED: LEFT SUPERIOR MEDIAL MIDBACK
LOCATION DETAILED: SUBXIPHOID
LOCATION DETAILED: RIGHT MEDIAL UPPER BACK

## 2024-10-30 ASSESSMENT — LOCATION SIMPLE DESCRIPTION DERM
LOCATION SIMPLE: RIGHT UPPER BACK
LOCATION SIMPLE: ABDOMEN
LOCATION SIMPLE: LEFT LOWER BACK

## 2024-10-30 ASSESSMENT — LOCATION ZONE DERM: LOCATION ZONE: TRUNK

## 2024-10-30 NOTE — HPI: DRY SKIN
Is This A New Presentation Or A Follow-Up?: Dry Skin
Additional History: Pt has very dry skin since moving to [place] from [place].

## 2024-10-30 NOTE — HPI: SKIN LESIONS
Is This A New Presentation, Or A Follow-Up?: Skin Lesion
Additional History: Pt first noticed it spot at least two years ago.

## 2024-10-30 NOTE — PROCEDURE: TREATMENT REGIMEN
Initiate Treatment: Gentle, fragrance free moisturizer daily (ie CeraVe moisturizing cream or Eucerin eczema relief cream; product handouts and coupons provided)
Discontinue Regimen: Bath and Body works lotion
Detail Level: Zone

## 2025-01-19 DIAGNOSIS — G89.29 CHRONIC PELVIC PAIN IN FEMALE: ICD-10-CM

## 2025-01-19 DIAGNOSIS — R10.9 ABDOMINAL PAIN, UNSPECIFIED ABDOMINAL LOCATION: ICD-10-CM

## 2025-01-19 DIAGNOSIS — R10.2 CHRONIC PELVIC PAIN IN FEMALE: ICD-10-CM

## 2025-03-11 RX ORDER — METAXALONE 800 MG/1
TABLET ORAL
Qty: 90 TABLET | Refills: 1 | Status: SHIPPED | OUTPATIENT
Start: 2025-03-11

## 2025-04-07 ENCOUNTER — OFFICE VISIT (OUTPATIENT)
Dept: FAMILY MEDICINE CLINIC | Age: 45
End: 2025-04-07
Payer: COMMERCIAL

## 2025-04-07 VITALS
WEIGHT: 223 LBS | HEIGHT: 66 IN | BODY MASS INDEX: 35.84 KG/M2 | SYSTOLIC BLOOD PRESSURE: 122 MMHG | DIASTOLIC BLOOD PRESSURE: 88 MMHG | OXYGEN SATURATION: 98 % | HEART RATE: 77 BPM

## 2025-04-07 DIAGNOSIS — Z79.899 MEDICAL MARIJUANA USE: ICD-10-CM

## 2025-04-07 DIAGNOSIS — R19.7 DIARRHEA, UNSPECIFIED TYPE: ICD-10-CM

## 2025-04-07 DIAGNOSIS — G89.29 CHRONIC PELVIC PAIN IN FEMALE: Primary | ICD-10-CM

## 2025-04-07 DIAGNOSIS — G43.909 MIGRAINE WITHOUT STATUS MIGRAINOSUS, NOT INTRACTABLE, UNSPECIFIED MIGRAINE TYPE: ICD-10-CM

## 2025-04-07 DIAGNOSIS — R10.2 CHRONIC PELVIC PAIN IN FEMALE: Primary | ICD-10-CM

## 2025-04-07 DIAGNOSIS — F45.9 SOMATOFORM DISORDER: ICD-10-CM

## 2025-04-07 PROCEDURE — 99215 OFFICE O/P EST HI 40 MIN: CPT | Performed by: FAMILY MEDICINE

## 2025-04-07 PROCEDURE — G8427 DOCREV CUR MEDS BY ELIG CLIN: HCPCS | Performed by: FAMILY MEDICINE

## 2025-04-07 PROCEDURE — 1036F TOBACCO NON-USER: CPT | Performed by: FAMILY MEDICINE

## 2025-04-07 PROCEDURE — G8417 CALC BMI ABV UP PARAM F/U: HCPCS | Performed by: FAMILY MEDICINE

## 2025-04-07 RX ORDER — GABAPENTIN 300 MG/1
900 CAPSULE ORAL 3 TIMES DAILY
COMMUNITY

## 2025-04-07 RX ORDER — FEXOFENADINE HCL 180 MG/1
180 TABLET ORAL DAILY
COMMUNITY

## 2025-04-07 SDOH — ECONOMIC STABILITY: FOOD INSECURITY: WITHIN THE PAST 12 MONTHS, THE FOOD YOU BOUGHT JUST DIDN'T LAST AND YOU DIDN'T HAVE MONEY TO GET MORE.: NEVER TRUE

## 2025-04-07 SDOH — ECONOMIC STABILITY: FOOD INSECURITY: WITHIN THE PAST 12 MONTHS, YOU WORRIED THAT YOUR FOOD WOULD RUN OUT BEFORE YOU GOT MONEY TO BUY MORE.: NEVER TRUE

## 2025-04-07 NOTE — PROGRESS NOTES
fall.    Patient is established unless otherwise noted.  Above chief complaint and [place] obtained by this physician provider.     Patient consented to using an automated transcription service to record our visit and provide summary directly into the chart.    Review of Systems   Constitutional:  Positive for fatigue. Negative for activity change, chills and fever.   HENT:  Negative for congestion.    Respiratory:  Negative for cough, chest tightness, shortness of breath and wheezing.    Cardiovascular:  Negative for chest pain and leg swelling.   Gastrointestinal:  Positive for abdominal pain and diarrhea.   Genitourinary:  Positive for pelvic pain and vaginal pain.   Musculoskeletal:  Negative for back pain and myalgias.   Skin:  Negative for rash.   Neurological:  Positive for headaches.   Psychiatric/Behavioral:  Negative for behavioral problems and dysphoric mood.        Allergies   Allergen Reactions   • Latex Rash   • Morphine Headaches and Hives   • Aspirin      headache   • Fentanyl Other (See Comments)     Pt sts causes migraines   • Hydrocodone-Acetaminophen Other (See Comments)     Pt sts causes migraines   • Hydromorphone Other (See Comments)     Pt sts causes migraines.    • Tylenol [Acetaminophen]      headache   • Acetaminophen-Codeine    • Tramadol    • Diatrizoate Hives     Allergy historyupdated.    Outpatient Medications Marked as Taking for the 4/7/25 encounter (Office Visit) with Karina Cordova MD   Medication Sig Dispense Refill   • diphenoxylate-atropine (LOMOTIL) 2.5-0.025 MG per tablet Take 1 tablet by mouth 4 times daily as needed for Diarrhea for up to 30 days. Max Daily Amount: 4 tablets 40 tablet 2   • medical marijuana as needed.     • gabapentin (NEURONTIN) 300 MG capsule Take 3 capsules by mouth 3 times daily.     • fexofenadine (ALLEGRA) 180 MG tablet Take 1 tablet by mouth daily     • metaxalone (SKELAXIN) 800 MG tablet TAKE 1 TABLET (800 MG TOTAL) BY MOUTH 3 TIMES A DAY AS NEEDED FOR

## 2025-04-13 RX ORDER — DIPHENOXYLATE HYDROCHLORIDE AND ATROPINE SULFATE 2.5; .025 MG/1; MG/1
1 TABLET ORAL 4 TIMES DAILY PRN
Qty: 40 TABLET | Refills: 2 | Status: SHIPPED | OUTPATIENT
Start: 2025-04-13 | End: 2025-05-13

## 2025-04-14 RX ORDER — METAXALONE 800 MG/1
800 TABLET ORAL 3 TIMES DAILY PRN
Qty: 90 TABLET | Refills: 2 | Status: SHIPPED | OUTPATIENT
Start: 2025-04-14 | End: 2025-04-16 | Stop reason: SDUPTHER

## 2025-04-16 ENCOUNTER — TELEPHONE (OUTPATIENT)
Dept: FAMILY MEDICINE CLINIC | Age: 45
End: 2025-04-16

## 2025-04-16 RX ORDER — METAXALONE 800 MG/1
800 TABLET ORAL 3 TIMES DAILY PRN
Qty: 90 TABLET | Refills: 2 | Status: SHIPPED | OUTPATIENT
Start: 2025-04-16

## 2025-04-16 NOTE — TELEPHONE ENCOUNTER
Pt stated pharmacy has not received order for Metaxalone. Pt stated pharmacy should be [facility] on [address]. Please advise

## 2025-04-24 ENCOUNTER — TELEPHONE (OUTPATIENT)
Dept: FAMILY MEDICINE CLINIC | Age: 45
End: 2025-04-24

## 2025-04-24 DIAGNOSIS — R63.4 WEIGHT LOSS: ICD-10-CM

## 2025-04-24 DIAGNOSIS — R16.0 LIVER MASS: Primary | ICD-10-CM

## 2025-04-24 NOTE — TELEPHONE ENCOUNTER
I wrote and order for liver CT, you can call her and fax to [facility] or [facility] or elsewhere as she prefers.  Insurance may deny so ask her to choose to see gastroenterology-hepatology at [facility] or [facility] her choice -- I will generate a referral to them now as backup plan.

## 2025-04-24 NOTE — TELEPHONE ENCOUNTER
Pt stated she went to [facility] yesterday due to stomach pain. She stated she was advised she has a liver mass in the upper left quadrant. She stated they told her she needs to see a liver specialist. Pt stated she is now down to 218 lbs without trying. Pt is requesting a referral. I advised pt PCP may want her to come in for an appt.  She stated she is an hour away and in pain that she would prefer a VV. Please advise

## 2025-04-25 NOTE — TELEPHONE ENCOUNTER
Pt stated she would like the referral to go to [facility]. She also would like the CT Scan done at [facility]. She stated she has no certain provider in mind. Pt stated [facility] gave her ibuprofen which she hasn't taken. She stated she was told by PCP that she should stop taking ibuprofen due to her endometriosis. Pt wants to know if she should take the prescribe ibuprofen in addition to the medication she is taking now. Pt wanted to clarify the pain she is having is not stomach pain or pelvic pain, she is having upper abdomen pain which hurts to touch and lay on. Pt seemed upset that PCP did not give a diagnosis from the visit notes she had at [facility]. I did advise pt she was given the option to come into the office for an appt or possibly do a VV. I advised pt PCP would have to see her and have additional testing done for a proper DX. Pt is requesting a VV. Please advise

## 2025-04-28 NOTE — TELEPHONE ENCOUNTER
Pt called stating she is in a lot of pain and would like something to relieve the pain so she can go back to work. She stated she is terrified about the 3cm mass she has in her liver and would like answers. She stated she feels her concerns are not being heard and is upset. I did advise pt PCP was out of office like I did when I spoke to pt on Thursday. Pt felt she should have answers to what's going on after her initial message but I explained again, PCP would not explain her situation in a message. Pt is asking for a Virtual Visit. Please advise

## 2025-05-01 ASSESSMENT — PATIENT HEALTH QUESTIONNAIRE - PHQ9
10. IF YOU CHECKED OFF ANY PROBLEMS, HOW DIFFICULT HAVE THESE PROBLEMS MADE IT FOR YOU TO DO YOUR WORK, TAKE CARE OF THINGS AT HOME, OR GET ALONG WITH OTHER PEOPLE: NOT DIFFICULT AT ALL
7. TROUBLE CONCENTRATING ON THINGS, SUCH AS READING THE NEWSPAPER OR WATCHING TELEVISION: SEVERAL DAYS
1. LITTLE INTEREST OR PLEASURE IN DOING THINGS: NOT AT ALL
1. LITTLE INTEREST OR PLEASURE IN DOING THINGS: NOT AT ALL
SUM OF ALL RESPONSES TO PHQ QUESTIONS 1-9: 8
5. POOR APPETITE OR OVEREATING: NEARLY EVERY DAY
5. POOR APPETITE OR OVEREATING: NEARLY EVERY DAY
9. THOUGHTS THAT YOU WOULD BE BETTER OFF DEAD, OR OF HURTING YOURSELF: NOT AT ALL
SUM OF ALL RESPONSES TO PHQ QUESTIONS 1-9: 8
8. MOVING OR SPEAKING SO SLOWLY THAT OTHER PEOPLE COULD HAVE NOTICED. OR THE OPPOSITE - BEING SO FIDGETY OR RESTLESS THAT YOU HAVE BEEN MOVING AROUND A LOT MORE THAN USUAL: NOT AT ALL
2. FEELING DOWN, DEPRESSED OR HOPELESS: SEVERAL DAYS
2. FEELING DOWN, DEPRESSED OR HOPELESS: SEVERAL DAYS
3. TROUBLE FALLING OR STAYING ASLEEP: NOT AT ALL
3. TROUBLE FALLING OR STAYING ASLEEP: NOT AT ALL
4. FEELING TIRED OR HAVING LITTLE ENERGY: NEARLY EVERY DAY
6. FEELING BAD ABOUT YOURSELF - OR THAT YOU ARE A FAILURE OR HAVE LET YOURSELF OR YOUR FAMILY DOWN: NOT AT ALL
SUM OF ALL RESPONSES TO PHQ QUESTIONS 1-9: 8
9. THOUGHTS THAT YOU WOULD BE BETTER OFF DEAD, OR OF HURTING YOURSELF: NOT AT ALL
SUM OF ALL RESPONSES TO PHQ QUESTIONS 1-9: 8
8. MOVING OR SPEAKING SO SLOWLY THAT OTHER PEOPLE COULD HAVE NOTICED. OR THE OPPOSITE, BEING SO FIGETY OR RESTLESS THAT YOU HAVE BEEN MOVING AROUND A LOT MORE THAN USUAL: NOT AT ALL
4. FEELING TIRED OR HAVING LITTLE ENERGY: NEARLY EVERY DAY
6. FEELING BAD ABOUT YOURSELF - OR THAT YOU ARE A FAILURE OR HAVE LET YOURSELF OR YOUR FAMILY DOWN: NOT AT ALL
SUM OF ALL RESPONSES TO PHQ QUESTIONS 1-9: 8
7. TROUBLE CONCENTRATING ON THINGS, SUCH AS READING THE NEWSPAPER OR WATCHING TELEVISION: SEVERAL DAYS
10. IF YOU CHECKED OFF ANY PROBLEMS, HOW DIFFICULT HAVE THESE PROBLEMS MADE IT FOR YOU TO DO YOUR WORK, TAKE CARE OF THINGS AT HOME, OR GET ALONG WITH OTHER PEOPLE: NOT DIFFICULT AT ALL

## 2025-05-02 ENCOUNTER — OFFICE VISIT (OUTPATIENT)
Dept: FAMILY MEDICINE CLINIC | Age: 45
End: 2025-05-02
Payer: COMMERCIAL

## 2025-05-02 VITALS
WEIGHT: 225.4 LBS | OXYGEN SATURATION: 96 % | HEART RATE: 72 BPM | SYSTOLIC BLOOD PRESSURE: 120 MMHG | BODY MASS INDEX: 36.38 KG/M2 | DIASTOLIC BLOOD PRESSURE: 72 MMHG

## 2025-05-02 DIAGNOSIS — R10.12 LUQ ABDOMINAL PAIN: Primary | ICD-10-CM

## 2025-05-02 DIAGNOSIS — R74.8 ELEVATED LIPASE: ICD-10-CM

## 2025-05-02 DIAGNOSIS — R16.0 LIVER MASS: ICD-10-CM

## 2025-05-02 PROCEDURE — 99214 OFFICE O/P EST MOD 30 MIN: CPT | Performed by: FAMILY MEDICINE

## 2025-05-02 PROCEDURE — G8427 DOCREV CUR MEDS BY ELIG CLIN: HCPCS | Performed by: FAMILY MEDICINE

## 2025-05-02 PROCEDURE — 36415 COLL VENOUS BLD VENIPUNCTURE: CPT | Performed by: FAMILY MEDICINE

## 2025-05-02 PROCEDURE — 1036F TOBACCO NON-USER: CPT | Performed by: FAMILY MEDICINE

## 2025-05-02 PROCEDURE — G8417 CALC BMI ABV UP PARAM F/U: HCPCS | Performed by: FAMILY MEDICINE

## 2025-05-02 RX ORDER — TRAMADOL HYDROCHLORIDE 50 MG/1
50 TABLET ORAL EVERY 6 HOURS PRN
COMMUNITY

## 2025-05-02 RX ORDER — TRAMADOL HYDROCHLORIDE 50 MG/1
50 TABLET ORAL EVERY 8 HOURS PRN
Qty: 45 TABLET | Refills: 1 | Status: SHIPPED | OUTPATIENT
Start: 2025-05-02 | End: 2025-06-01

## 2025-05-02 NOTE — PROGRESS NOTES
Chief Complaint   Patient presents with    Discuss Labs     Patient was seen for illness at [facility] and had abnormal liver imaging and some mildly abnormal laboratory findings    Abdominal Pain     She is describing WORSENED LUQ pain that she did report to me at the last visit    Pelvic Pain     Patient with chronic pelvic pain and has seen multiple gyn providers, urogyn providers and chronic pain providers with limited relief, currently taking medical marijuana daily    Weight Loss     She reports unintentional weight loss (31 lbs since January) and nausea and early satiety as well,  but had lower endoscopy in the last six months, she does not remember the providers name who did that procedure       [place] NARRATIVE:    History of Present Illness  The patient is following up for abdominal pain and abnormal findings on evaluation at a [place] facility.  She presents today with her son accompanying her.      Significant anxiety is reported due to a perceived liver abnormality, described as a mass. HOWEVER, I reminded her that the liver abnormality has been present since 2013 and remains essentially unchanged. Most recent imaging study performed in [place] ws not compared to images or reports from two years ago.     Elevated lipase levels were noted, but they are not alarmingly high. She has been advised to consult with a hepatologist, and we had offered to refer her but she had multiple questions and concerns so she was asked to come in. Pain management has been achieved with tramadol, which is effective for upper abdominal pain but not for pelvic discomfort. She has only 3 tablets of tramadol left and needs a refill. She takes 1 tablet at work and 1 at home when she gets off, avoiding morning doses to reserve medication for work. CBN and CBG, which \"do not contain THC,\" are used for inflammation and to aid in food retention.     Persistent left upper quadrant pain, progressively worsening since the last

## 2025-05-03 LAB
ALBUMIN SERPL-MCNC: 4.4 G/DL (ref 3.4–5)
ALP SERPL-CCNC: 92 U/L (ref 40–129)
ALT SERPL-CCNC: 9 U/L (ref 10–40)
ANION GAP SERPL CALCULATED.3IONS-SCNC: 11 MMOL/L (ref 3–16)
AST SERPL-CCNC: 12 U/L (ref 15–37)
BASOPHILS # BLD: 0.1 K/UL (ref 0–0.2)
BASOPHILS NFR BLD: 1.1 %
BILIRUB DIRECT SERPL-MCNC: <0.1 MG/DL (ref 0–0.3)
BILIRUB INDIRECT SERPL-MCNC: ABNORMAL MG/DL (ref 0–1)
BILIRUB SERPL-MCNC: <0.2 MG/DL (ref 0–1)
BUN SERPL-MCNC: 14 MG/DL (ref 7–20)
CALCIUM SERPL-MCNC: 10.2 MG/DL (ref 8.3–10.6)
CHLORIDE SERPL-SCNC: 99 MMOL/L (ref 99–110)
CO2 SERPL-SCNC: 25 MMOL/L (ref 21–32)
CREAT SERPL-MCNC: 0.8 MG/DL (ref 0.6–1.1)
DEPRECATED RDW RBC AUTO: 14.5 % (ref 12.4–15.4)
EOSINOPHIL # BLD: 0.3 K/UL (ref 0–0.6)
EOSINOPHIL NFR BLD: 3.8 %
GFR SERPLBLD CREATININE-BSD FMLA CKD-EPI: >90 ML/MIN/{1.73_M2}
GLUCOSE SERPL-MCNC: 78 MG/DL (ref 70–99)
HCT VFR BLD AUTO: 38 % (ref 36–48)
HGB BLD-MCNC: 12.8 G/DL (ref 12–16)
LIPASE SERPL-CCNC: 31 U/L (ref 13–60)
LYMPHOCYTES # BLD: 2.3 K/UL (ref 1–5.1)
LYMPHOCYTES NFR BLD: 31.7 %
MCH RBC QN AUTO: 30.8 PG (ref 26–34)
MCHC RBC AUTO-ENTMCNC: 33.6 G/DL (ref 31–36)
MCV RBC AUTO: 91.7 FL (ref 80–100)
MONOCYTES # BLD: 0.5 K/UL (ref 0–1.3)
MONOCYTES NFR BLD: 7.2 %
NEUTROPHILS # BLD: 4.2 K/UL (ref 1.7–7.7)
NEUTROPHILS NFR BLD: 56.2 %
PHOSPHATE SERPL-MCNC: 3.3 MG/DL (ref 2.5–4.9)
PLATELET # BLD AUTO: 484 K/UL (ref 135–450)
PMV BLD AUTO: 9.2 FL (ref 5–10.5)
POTASSIUM SERPL-SCNC: 4.5 MMOL/L (ref 3.5–5.1)
PROT SERPL-MCNC: 7.2 G/DL (ref 6.4–8.2)
RBC # BLD AUTO: 4.15 M/UL (ref 4–5.2)
SODIUM SERPL-SCNC: 135 MMOL/L (ref 136–145)
WBC # BLD AUTO: 7.4 K/UL (ref 4–11)

## 2025-05-04 ENCOUNTER — RESULTS FOLLOW-UP (OUTPATIENT)
Dept: FAMILY MEDICINE CLINIC | Age: 45
End: 2025-05-04

## 2025-05-04 NOTE — RESULT ENCOUNTER NOTE
Labs are looking ok.  Blood count is pretty good with slightly high platelets which has been present in the past.  No white count to indicate any infection.  Kidney function is ok just very slightly low sodium we can watch. Liver function is good, enzymes are very slightly low which is fine.  Lipase has returned to normal.    Let us know if you have not heard from GI to schedule appt by the end of this week.

## 2025-07-16 ENCOUNTER — TELEPHONE (OUTPATIENT)
Dept: FAMILY MEDICINE CLINIC | Age: 45
End: 2025-07-16

## 2025-07-16 NOTE — TELEPHONE ENCOUNTER
Contacted the patient to let her know that we do not have a copy of her FMLA forms that she was requesting to have completed by the provider at her [facility] appointment.  Patient stated that she was advised that we had a copy of her forms and that she needs them completed today.  I advised that provider folder and MA folder has been looked through but there is not blank forms for her.  Patient stated that she could Email them now, advised the patient that Dr. Cordova would not be filling them out.  Advised the patient that she will need to follow up with a new PCP due to patient has been seeing an Internal Medicine Doctor from [facility]. Patient stated that she is not seeing any other providers beside Dr. Cordova at this time.  I advised that she would receive a letter of dismissal from our office.  At this time the patient then started to speak very loudly and stated that she knows that someone in our office is a liar, and she knows that we do have the blank forms and she will now be done with our office as of now.    Patient has now been dismissed, letter generated, waiting signature from Dr. Cordova.

## 2025-07-16 NOTE — TELEPHONE ENCOUNTER
nato Ortiz MA called the patient as requested per Dr. Cordova to let her know that she will be dismissed because she is seeing PCP at [facility].  The patient was getting upset on the phone with Diana and was placed on hold and sent to my phone.  I explained to the patient that Dr. Cordova was looking at her notes from [facility] and there were several notes from PCP about issues the patient was having and the patient also talked to them about her FMLA paperwork.  The patient stated that she was not seeing a PCP provider there and that is just the way they have their appts at [facility].  She kept saying that she needed her FMLA paperwork filled out and I told her that I would discuss this with Dr. Cordova again.  She stated that she wanted a return call by the end of the day.  Dr. Cordova stated that she did not have a copy of patient's FMLA paperwork and she was not going to fill it out and the patient was to be dismissed.  Diana did call the patient as noted below.

## 2025-07-16 NOTE — TELEPHONE ENCOUNTER
Jo Ann was scheduled with me today to complete FMLA forms that I have completed for her previously.  Due to living in [place] she was scheduled for virtual visit.  We were unable to locate any incoming blank forms to be completed today and in reviewing her chart in epic today prior to this visit, I reviewed outside records as she had been referred to gastroenterology specialist at [facility].    Review of care everywhere records show multiple visits to providers in [place] including [facility] where she saw pain management and pelvic pain specialist, there are also multiple encounters with and in particular 1 in person office visit with NP Rachel Sandoval listed as family medicine provider with [facility] on 6/27/2025 which included multiple notes about GI symptoms, liver problems, new rash, chronic pelvic pain, FMLA paperwork, referrals and multiple prescriptions etc.  This was determined by myself to be a primary care visit.  As has previously been discussed with patient she can only have a single primary care provider.  Patient insisted that she had not seen a new primary care provider in [place] despite these records    We called her to cancel her visit today and will be issuing her a dismissal letter.  We are not in receipt of any blank FMLA forms so I declined to complete even a temporary extension of FMLA.  Despite her claim that she would need to find a new primary care provider, multiple future appointments are also viewable with care everywhere functionality with this provider who is listed as a family practice provider and is addressing multiple system problems as same.    Her dismissal was discussed with our practice manager Paty, who appropriately acted at my request on my decision not to continue providing care for this patient.  Completion of additional medical leave forms is not considered emergency care.

## 2025-07-26 DIAGNOSIS — R10.2 CHRONIC PELVIC PAIN IN FEMALE: ICD-10-CM

## 2025-07-26 DIAGNOSIS — G89.29 CHRONIC PELVIC PAIN IN FEMALE: ICD-10-CM

## 2025-07-26 DIAGNOSIS — R10.9 ABDOMINAL PAIN, UNSPECIFIED ABDOMINAL LOCATION: ICD-10-CM

## 2025-08-09 DIAGNOSIS — R10.2 CHRONIC PELVIC PAIN IN FEMALE: ICD-10-CM

## 2025-08-09 DIAGNOSIS — G89.29 CHRONIC PELVIC PAIN IN FEMALE: ICD-10-CM

## 2025-08-09 DIAGNOSIS — R10.9 ABDOMINAL PAIN, UNSPECIFIED ABDOMINAL LOCATION: ICD-10-CM
